# Patient Record
Sex: MALE | Race: WHITE | NOT HISPANIC OR LATINO | URBAN - METROPOLITAN AREA
[De-identification: names, ages, dates, MRNs, and addresses within clinical notes are randomized per-mention and may not be internally consistent; named-entity substitution may affect disease eponyms.]

---

## 2019-09-09 ENCOUNTER — HOSPITAL ENCOUNTER (OUTPATIENT)
Dept: OTHER | Facility: HOSPITAL | Age: 34
Discharge: HOME | End: 2019-09-09
Attending: NURSE PRACTITIONER

## 2019-09-09 ENCOUNTER — OFFICE VISIT CONVERTED (OUTPATIENT)
Dept: FAMILY MEDICINE CLINIC | Age: 34
End: 2019-09-09
Attending: NURSE PRACTITIONER

## 2019-09-09 LAB
ALBUMIN SERPL-MCNC: 4.4 G/DL (ref 3.5–5)
ALBUMIN/GLOB SERPL: 1.4 {RATIO} (ref 1.4–2.6)
ALP SERPL-CCNC: 60 U/L (ref 53–128)
ALT SERPL-CCNC: 21 U/L (ref 10–40)
ANION GAP SERPL CALC-SCNC: 15 MMOL/L (ref 8–19)
AST SERPL-CCNC: 15 U/L (ref 15–50)
BASOPHILS # BLD AUTO: 0.06 10*3/UL (ref 0–0.2)
BASOPHILS NFR BLD AUTO: 0.6 % (ref 0–3)
BILIRUB SERPL-MCNC: 0.38 MG/DL (ref 0.2–1.3)
BUN SERPL-MCNC: 10 MG/DL (ref 5–25)
BUN/CREAT SERPL: 9 {RATIO} (ref 6–20)
CALCIUM SERPL-MCNC: 9.2 MG/DL (ref 8.7–10.4)
CHLORIDE SERPL-SCNC: 103 MMOL/L (ref 99–111)
CHOLEST SERPL-MCNC: 171 MG/DL (ref 107–200)
CHOLEST/HDLC SERPL: 4.8 {RATIO} (ref 3–6)
CONV ABS IMM GRAN: 0.02 10*3/UL (ref 0–0.2)
CONV CO2: 25 MMOL/L (ref 22–32)
CONV IMMATURE GRAN: 0.2 % (ref 0–1.8)
CONV TOTAL PROTEIN: 7.5 G/DL (ref 6.3–8.2)
CREAT UR-MCNC: 1.06 MG/DL (ref 0.7–1.2)
DEPRECATED RDW RBC AUTO: 47.3 FL (ref 35.1–43.9)
EOSINOPHIL # BLD AUTO: 0.12 10*3/UL (ref 0–0.7)
EOSINOPHIL # BLD AUTO: 1.3 % (ref 0–7)
ERYTHROCYTE [DISTWIDTH] IN BLOOD BY AUTOMATED COUNT: 13.6 % (ref 11.6–14.4)
GFR SERPLBLD BASED ON 1.73 SQ M-ARVRAT: >60 ML/MIN/{1.73_M2}
GLOBULIN UR ELPH-MCNC: 3.1 G/DL (ref 2–3.5)
GLUCOSE SERPL-MCNC: 89 MG/DL (ref 70–99)
HCT VFR BLD AUTO: 46.5 % (ref 42–52)
HDLC SERPL-MCNC: 36 MG/DL (ref 40–60)
HGB BLD-MCNC: 15.4 G/DL (ref 14–18)
LDLC SERPL CALC-MCNC: 112 MG/DL (ref 70–100)
LYMPHOCYTES # BLD AUTO: 2.3 10*3/UL (ref 1–5)
LYMPHOCYTES NFR BLD AUTO: 24.6 % (ref 20–45)
MCH RBC QN AUTO: 31.3 PG (ref 27–31)
MCHC RBC AUTO-ENTMCNC: 33.1 G/DL (ref 33–37)
MCV RBC AUTO: 94.5 FL (ref 80–96)
MONOCYTES # BLD AUTO: 0.78 10*3/UL (ref 0.2–1.2)
MONOCYTES NFR BLD AUTO: 8.3 % (ref 3–10)
NEUTROPHILS # BLD AUTO: 6.07 10*3/UL (ref 2–8)
NEUTROPHILS NFR BLD AUTO: 65 % (ref 30–85)
NRBC CBCN: 0 % (ref 0–0.7)
OSMOLALITY SERPL CALC.SUM OF ELEC: 287 MOSM/KG (ref 273–304)
PLATELET # BLD AUTO: 274 10*3/UL (ref 130–400)
PMV BLD AUTO: 9.9 FL (ref 9.4–12.4)
POTASSIUM SERPL-SCNC: 4.1 MMOL/L (ref 3.5–5.3)
RBC # BLD AUTO: 4.92 10*6/UL (ref 4.7–6.1)
SODIUM SERPL-SCNC: 139 MMOL/L (ref 135–147)
TRIGL SERPL-MCNC: 115 MG/DL (ref 40–150)
TSH SERPL-ACNC: 2.21 M[IU]/L (ref 0.27–4.2)
VLDLC SERPL-MCNC: 23 MG/DL (ref 5–37)
WBC # BLD AUTO: 9.35 10*3/UL (ref 4.8–10.8)

## 2019-10-17 ENCOUNTER — OFFICE VISIT CONVERTED (OUTPATIENT)
Dept: PODIATRY | Facility: CLINIC | Age: 34
End: 2019-10-17
Attending: PODIATRIST

## 2019-11-25 ENCOUNTER — OFFICE VISIT CONVERTED (OUTPATIENT)
Dept: FAMILY MEDICINE CLINIC | Age: 34
End: 2019-11-25
Attending: NURSE PRACTITIONER

## 2020-04-27 ENCOUNTER — OFFICE VISIT CONVERTED (OUTPATIENT)
Dept: FAMILY MEDICINE CLINIC | Age: 35
End: 2020-04-27
Attending: NURSE PRACTITIONER

## 2021-05-15 VITALS
HEIGHT: 75 IN | WEIGHT: 239 LBS | DIASTOLIC BLOOD PRESSURE: 78 MMHG | BODY MASS INDEX: 29.72 KG/M2 | HEART RATE: 98 BPM | SYSTOLIC BLOOD PRESSURE: 147 MMHG | OXYGEN SATURATION: 98 %

## 2021-05-18 NOTE — PROGRESS NOTES
Carroll Patterson  1985     Office/Outpatient Visit    Visit Date: Mon, Apr 27, 2020 01:18 pm    Provider: Aric Saxena N.P. (Assistant: Makenzie Livingston MA)    Location: [facility]        Electronically signed by Aric Saxena N.P. on  04/27/2020 02:20:53 PM                             Subjective:        CC: Mr. Patterson is a 35 year old White male.  This is a follow-up visit.  [phone]            HPI:       Telehealth video visit for follow up on anxiety and depression. Taking Lexapro 20mg daily but having frequent episodes of aniety and mood swings, not wanting to do things even when has chance. Denies suicidal thoughts     ROS:     CONSTITUTIONAL:  Negative for chills, fatigue, fever, and weight change.      GASTROINTESTINAL:  Positive for acid reflux symptoms ( burning in throat and upper chest; indigestion and belching; worse with pizza sauce ).      PSYCHIATRIC:  Positive for anxiety, depression and feelings of stress.   Negative for suicidal thoughts.          Past Medical History / Family History / Social History:         Last Reviewed on 4/27/2020 02:17 PM by Aric Saxena    Past Medical History:             PAST MEDICAL HISTORY     UNREMARKABLE     Several concussions in the past playing foot ball         Surgical History:     NONE         Family History:     Father: Cause of death was 56, cancer multiple    ; Positive for Hypertension and Hyperlipidemia;     ; Positive for Agent Orange;     Mother:    Positive for Hypertension and Hyperlipidemia;     Brother(s): 1 brother alive and healthy brother(s) total     Sister(s): Medical history unknown; 2 , 1/2 sisters sister(s) total         Social History:     Occupation: [employer]     Marital Status: Engaged     Children: 1 child         Tobacco/Alcohol/Supplements:     Last Reviewed on 4/27/2020 02:17 PM by Aric Saxena    Tobacco: Current Smoker: He currently smokes every day, (start age 27  pack-year history); Cigars.          Mental Health History:     Last Reviewed on 4/27/2020 02:17 PM by Aric Saxena        Current Problems:     Last Reviewed on 4/27/2020 02:17 PM by Aric Saxena    Dysthymic disorder    Gastro-esophageal reflux disease without esophagitis    Corns and callosities        Immunizations:     None        Allergies:     Last Reviewed on 4/27/2020 02:17 PM by Aric Saxena    Amoxicillin:          Current Medications:     Last Reviewed on 4/27/2020 02:17 PM by Aric Saxena    Protonix 40 mg oral tablet, delayed release (enteric coated) [take 1 tablet (40 mg) by oral route 2 times per day]    Lexapro 20 mg oral tablet [take 1 tablet (20 mg) by oral route once daily]        Objective:        Exams:     PHYSICAL EXAM:     GENERAL: Vitals recorded well developed, well nourished;  well groomed;  no apparent distress;     NEUROLOGIC: GROSSLY INTACT     PSYCHIATRIC: appropriate affect and demeanor; normal psychomotor function; normal speech pattern;         Assessment:         F34.1   Dysthymic disorder       K21.9   Gastro-esophageal reflux disease without esophagitis           ORDERS:         Meds Prescribed:       [Refilled] Lexapro 20 mg oral tablet [take 1 tablet (20 mg) by oral route once daily], #90 (ninety) tablets, Refills: 1 (one)       [New Rx] buPROPion  mg oral tablet, sustained-release 12 hr [take 1 tablet (100 mg) by oral route 2 times per day], #30 (thirty) tablets, Refills: 0 (zero)       [New Rx] RABEprazole 20 mg oral tablet, delayed release (enteric coated) [take 1 tablet (20 mg) by oral route daily prn ], #30 (thirty) tablets, Refills: 0 (zero)                 Plan:         Dysthymic disorder    Telehealth: Verbal consent obtained for visit to occur via televideo conferencing; Staff, other than provider, present during telephone visit include Handy Patterson pt , Cade Saxena APRN     FOLLOW-UP: Schedule follow-up appointments on a p.r.n.  basis.:.:for see how he is doing in 2 weeks with med changes           Prescriptions:       [Refilled] Lexapro 20 mg oral tablet [take 1 tablet (20 mg) by oral route once daily], #90 (ninety) tablets, Refills: 1 (one)       [New Rx] buPROPion  mg oral tablet, sustained-release 12 hr [take 1 tablet (100 mg) by oral route 2 times per day], #30 (thirty) tablets, Refills: 0 (zero)         Gastro-esophageal reflux disease without esophagitis          Prescriptions:       [New Rx] RABEprazole 20 mg oral tablet, delayed release (enteric coated) [take 1 tablet (20 mg) by oral route daily prn ], #30 (thirty) tablets, Refills: 0 (zero)             Patient Recommendations:        For  Dysthymic disorder:    Schedule follow-up appointments as needed.                APPOINTMENT INFORMATION:        Monday Tuesday Wednesday Thursday Friday Saturday Sunday            Time:___________________AM  PM   Date:_____________________             Charge Capture:         Primary Diagnosis:     F34.1  Dysthymic disorder           Orders:      80209  Office/outpatient visit; established patient, level 3  (In-House)              K21.9  Gastro-esophageal reflux disease without esophagitis

## 2021-05-18 NOTE — PROGRESS NOTES
Carroll Patterson 1985     Office/Outpatient Visit    Visit Date: Mon, Sep 9, 2019 05:12 pm    Provider: Aric Saxena N.P. (Assistant: Sofia Da Silva MA)    Location: [facility]        Electronically signed by Aric Saxena N.P. on  09/13/2019 12:57:31 PM                             SUBJECTIVE:        CC:     Mr. Patterson is a 34 year old White male.  This is his first visit to the clinic.  establishment         HPI:         PHQ-9 Depression Screening: Completed form scanned and in chart; Total Score 16         Concerning annual exam, his last physical exam was over 5 years ago.  He performs testicular self-exams occasionally.  Depression screening is positive for anxious mood and sadness.  He is not current with his influenza immunization.      Smoking status: Mr. Patterson currently smokes cigarettes.      ROS:     CONSTITUTIONAL:  Negative for chills, fatigue, fever, and weight change.      CARDIOVASCULAR:  Negative for chest pain, palpitations, tachycardia, orthopnea, and edema.      RESPIRATORY:  Positive for Smoker not ready to quit.   Negative for recent cough, dyspnea or frequent wheezing.      GASTROINTESTINAL:  Positive for acid reflux symptoms ( acid taste in mouth; burning in throat and upper chest; indigestion and belching; several year hx , never had EGD  takes PPI with some improvement , never been tested for H pylori ).   Negative for abdominal pain, constipation, diarrhea, nausea or vomiting.      GENITOURINARY:  Negative for dysuria and hematuria.      MUSCULOSKELETAL:  Negative for arthralgias and myalgias.      INTEGUMENTARY:  Positive for corns and callous on both feet, has used everything OTC and nonthing has made them clear up wants to see foot Md and Skin nodule on right elbow x 2 years , is not painful , moves around and can bend it in half.      NEUROLOGICAL:  Negative for dizziness, memory loss, paresthesias, tremor and weakness.      PSYCHIATRIC:  Positive for  anxiety, depression and feelings of stress.   Negative for sleep disturbance or suicidal thoughts.          PMH/FMH/SH:     Last Reviewed on 9/09/2019 05:40 PM by Aric Saxena    Past Medical History:             PAST MEDICAL HISTORY     UNREMARKABLE     Several concussions in the past playing foot ball         Surgical History:     NONE         Family History:     Father: Cause of death was 56, cancer multiple    ; Positive for Hypertension and Hyperlipidemia;     ; Positive for Agent Orange;     Mother:    Positive for Hypertension and Hyperlipidemia;     Brother(s): 1 brother alive and healthy brother(s) total     Sister(s): Medical history unknown; 2 , 1/2 sisters sister(s) total         Social History:     Occupation: [employer]     Marital Status: Engaged     Children: 1 child         Tobacco/Alcohol/Supplements:     Last Reviewed on 9/09/2019 05:40 PM by Aric Saxena    Tobacco: Current Smoker: He currently smokes every day, (start age 27 pack-year history); Cigars.          Mental Health History:     Last Reviewed on 9/09/2019 05:40 PM by Aric Saxena            Current Problems:     Last Reviewed on 9/09/2019 05:40 PM by Aric Saxena    Skin nodule     Screening for depression         Immunizations:     None        Allergies:     Last Reviewed on 9/09/2019 05:40 PM by Aric Saxena    Amoxicillin:        Current Medications:     Last Reviewed on 9/09/2019 05:40 PM by Aric Saxena      No Known Medications.         OBJECTIVE:        Vitals:         Current: 9/9/2019 5:17:18 PM    Ht:  6 ft, 3 in;  Wt: 238.8 lbs;  BMI: 29.8    T: 99.2 F;  BP: 140/83 mm Hg (left arm, sitting);  P: 98 bpm (left arm (BP Cuff), sitting)        Exams:     PHYSICAL EXAM:     GENERAL: Vitals recorded well developed, well nourished;  well groomed;  no apparent distress;     E/N/T:  normal EACs, TMs, nasal/oral mucosa, teeth, gingiva, and oropharynx;     RESPIRATORY: normal respiratory rate and  pattern with no distress; normal breath sounds with no rales, rhonchi, wheezes or rubs;     CARDIOVASCULAR: normal rate; rhythm is regular;  no systolic murmur; no edema;     GASTROINTESTINAL: mild epigastric tenderness;  normal bowel sounds; no organomegaly;     SKIN: Corns chucky feet;  right elbow semi firm mobile nontender nodule right elbow;     MUSCULOSKELETAL:  Normal range of motion, strength and tone;     NEUROLOGIC: CN 2-12, motor and sensory function, reflexes, gait and coordination are all intact;     PSYCHIATRIC:  appropriate affect and demeanor; normal speech pattern; grossly normal memory;         ASSESSMENT           V79.0   Z13.31  Screening for depression              DDx:     V70.0   Z00.00  Annual exam              DDx:     782.2   R22.31  Skin nodule              DDx:     530.81   K21.9  Gastroesophageal reflux disease              DDx:     700   L84  Corns and callosities              DDx:     300.4   F34.1  Anxiety with depression              DDx:         ORDERS:         Meds Prescribed:       Protonix (Pantoprazole) 40mg Tablets, Delayed Release 1 tab daily  #30 (Thirty) tablet(s) Refills: 2       Carafate (Sucralfate) 1gm Tablet 1 TAB BID  #20 (Twenty) tablet(s) Refills: 0       Lexapro (Escitalopram Oxalate) 20mg Tablet 1/2 tab at h.s. FOR 1 WEEK, THEN INCREASE TO 1 TAB QHS  #30 (Thirty) tablet(s) Refills: 1         Lab Orders:       [id number]  [facility] PHYSICAL: CMP, CBC, TSH, LIPID: [id number], [id number], [id number], [id number]  (Send-Out)         79405  [facility] - [facility] H.pylori Breath test  (Send-Out)         APPTO  Appointment need  (In-House)           Procedures Ordered:       REFER  Referral to Specialist or Other Facility  (Send-Out)           Other Orders:         Depression screen positive and follow up plan documented  (In-House)         1101F  Pt screen for fall risk; document no falls in past year or only 1 fall w/o injury in past year (BRADY)  (In-House)                   PLAN:          Screening  for depression Harshal mooney pt, # [id number] neg. dmt     MIPS PHQ-9 Depression Screening: Completed form scanned and in chart; Total Score 16 Positive Depression Screen: Pharmacologic intervention initiated/modified; 1mo           Orders:         Depression screen positive and follow up plan documented  (In-House)            Annual exam     LABORATORY:  Labs ordered to be performed today include PHYSICAL PANEL; CMP, CBC, TSH, LIPID.  MIPS Has had no falls in the past year Vaccines Flu and Pneumonia updated in Shot record   Smoking cessation encouraged. Counseling for less than 3 minutes.            Orders:       [id number]  [facility] PHYSICAL: CMP, CBC, TSH, LIPID: [id number], [id number], 71633, 55297  (Send-Out)         1101F  Pt screen for fall risk; document no falls in past year or only 1 fall w/o injury in past year (BRADY)  (In-House)            Skin nodule Referral to Dr Eden , [facility]          Gastroesophageal reflux disease     LABORATORY:  Labs ordered to be performed today include H. pylori breath test.      REFERRALS:  Referral initiated to a general surgeon ( Dr. Prateek Eden ).            Prescriptions:       Protonix (Pantoprazole) 40mg Tablets, Delayed Release 1 tab daily  #30 (Thirty) tablet(s) Refills: 2       Carafate (Sucralfate) 1gm Tablet 1 TAB BID  #20 (Twenty) tablet(s) Refills: 0           Orders:       [id number]  [facility] H.pylori Breath test  (Send-Out)            Corns and callosities         REFERRALS:  Referral initiated to a podiatrist ( Dharmesh Guido [facility] ).            Orders:       REFER  Referral to Specialist or Other Facility  (Send-Out)            Anxiety with depression         FOLLOW-UP: Schedule a follow-up visit in 1 month..            Prescriptions:       Lexapro (Escitalopram Oxalate) 20mg Tablet 1/2 tab at h.s. FOR 1 WEEK, THEN INCREASE TO 1 TAB QHS  #30 (Thirty) tablet(s) Refills: 1           Orders:       APPTO  Appointment need  (In-House)               Patient  Recommendations:        For  Anxiety with depression:     Schedule a follow-up visit in 1 month.                APPOINTMENT INFORMATION:        Monday Tuesday Wednesday Thursday Friday Saturday Sunday            Time:___________________AM  PM   Date:_____________________             CHARGE CAPTURE           **Please note: ICD descriptions below are intended for billing purposes only and may not represent clinical diagnoses**        Primary Diagnosis:         V79.0 Screening for depression            Z13.31    Encounter for screening for depression              Orders:          99312 -25  Office visit - new pt, level 3  (In-House)                Depression screen positive and follow up plan documented  (In-House)           V70.0 Annual exam            Z00.00    Encounter for general adult medical examination without abnormal findings              Orders:          18889   Preventive medicine, new patient, age 18-39 years  (In-House)             1101F   Pt screen for fall risk; document no falls in past year or only 1 fall w/o injury in past year (BRADY)  (In-House)           782.2 Skin nodule            R22.31    Localized swelling, mass and lump, right upper limb    530.81 Gastroesophageal reflux disease            K21.9    Gastro-esophageal reflux disease without esophagitis    700 Corns and callosities            L84    Corns and callosities    300.4 Anxiety with depression            F34.1    Dysthymic disorder              Orders:          APPTO   Appointment need  (In-House)

## 2021-05-18 NOTE — PROGRESS NOTES
Carroll Patterson  1985     Office/Outpatient Visit    Visit Date: Mon, Nov 25, 2019 05:00 pm    Provider: Aric Saxena N.P. (Assistant: Itzel Hobson MA)    Location: [facility]        Electronically signed by Aric Saxena N.P. on  11/25/2019 05:42:40 PM                             Subjective:        CC: Mr. Patterson is a 34 year old White male.  This is a follow-up visit.          HPI:       Taking Lexapro with improvement in mood. Still has occasional outburst  but very mild and infrequent. Feeling much better.     ROS:     CONSTITUTIONAL:  Negative for chills, fatigue and fever.      CARDIOVASCULAR:  Negative for chest pain, orthopnea, paroxysmal nocturnal dyspnea and pedal edema.      RESPIRATORY:  Negative for dyspnea and cough.      GASTROINTESTINAL:  Positive for acid reflux symptoms ( burning in throat and upper chest; indigestion and belching; taking Protonix 40mg daily with some mild improvement, took 2 weeks Carafate and stomach felt much better while taking it but once finished his symtpoms have returned, missed apt with GI because was not told of appt. ).      PSYCHIATRIC:  Positive for anxiety, depression and feelings of stress ( (improved) ).   Negative for suicidal thoughts.          Past Medical History / Family History / Social History:         Last Reviewed on 11/25/2019 05:42 PM by Aric Saxena    Past Medical History:             PAST MEDICAL HISTORY     UNREMARKABLE     Several concussions in the past playing foot ball         Surgical History:     NONE         Family History:     Father: Cause of death was 56, cancer multiple    ; Positive for Hypertension and Hyperlipidemia;     ; Positive for Agent Orange;     Mother:    Positive for Hypertension and Hyperlipidemia;     Brother(s): 1 brother alive and healthy brother(s) total     Sister(s): Medical history unknown; 2 , 1/2 sisters sister(s) total         Social History:     Occupation: [employer]     Marital Status: Engaged     Children: 1 child         Tobacco/Alcohol/Supplements:     Last Reviewed on 11/25/2019 05:42 PM by Aric Saxena    Tobacco: Current Smoker: He currently smokes every day, (start age 27 pack-year history); Cigars.          Mental Health History:     Last Reviewed on 11/25/2019 05:42 PM by Aric Saxena        Current Problems:     Last Reviewed on 11/25/2019 05:42 PM by Aric Saxena    Dysthymic disorder    Gastro-esophageal reflux disease without esophagitis    Corns and callosities        Immunizations:     None        Allergies:     Last Reviewed on 11/25/2019 05:42 PM by Aric Saxena    Amoxicillin:          Current Medications:     Last Reviewed on 11/25/2019 05:42 PM by Aric Saxena    Protonix 40mg Tablets, Delayed Release [1 tab daily]    Lexapro 20 mg oral tablet [take 1 tablet (20 mg) by oral route once daily]        Objective:        Vitals:         Current: 11/25/2019 5:03:36 PM    Ht:  6 ft, 3 in;  Wt: 240.6 lbs;  BMI: 30.1T: 98.3 F (oral);  BP: 133/72 mm Hg (left arm, sitting);  P: 71 bpm (left arm (BP Cuff), sitting);  sCr: 1.06 mg/dL;  GFR: 113.39        Exams:     PHYSICAL EXAM:     GENERAL: Vitals recorded well developed, well nourished;  well groomed;  no apparent distress;     E/N/T:  normal EACs, TMs, nasal/oral mucosa, teeth, gingiva, and oropharynx;     RESPIRATORY: normal respiratory rate and pattern with no distress; normal breath sounds with no rales, rhonchi, wheezes or rubs;     CARDIOVASCULAR: normal rate; rhythm is regular;  normal S1; normal S2; no systolic murmur; no cyanosis; no edema;     GASTROINTESTINAL: mild epigastric tenderness;  normal bowel sounds; no organomegaly;     NEUROLOGIC: GROSSLY INTACT     PSYCHIATRIC:  appropriate affect and demeanor; normal speech pattern; grossly normal memory;         Assessment:         F34.1   Dysthymic disorder       K21.9   Gastro-esophageal reflux disease without  esophagitis           ORDERS:         Meds Prescribed:       [Refilled] Protonix 40 mg oral tablet, delayed release (enteric coated) [take 1 tablet (40 mg) by oral route 2 times per day], #60 (sixty) tablets, Refills: 2 (two)       [Refilled] Lexapro 20 mg oral tablet [take 1 tablet (20 mg) by oral route once daily], #90 (ninety) tablets, Refills: 3 (three)                 Plan:         Dysthymic disorder          Prescriptions:       [Refilled] Lexapro 20 mg oral tablet [take 1 tablet (20 mg) by oral route once daily], #90 (ninety) tablets, Refills: 3 (three)         Gastro-esophageal reflux disease without esophagitisWill get apt with GI rescheduled. Did not receive call to notify of apt. dmt          Prescriptions:       [Refilled] Protonix 40 mg oral tablet, delayed release (enteric coated) [take 1 tablet (40 mg) by oral route 2 times per day], #60 (sixty) tablets, Refills: 2 (two)             Charge Capture:         Primary Diagnosis:     F34.1  Dysthymic disorder           Orders:      [id number]  Office/outpatient visit; established patient, level 3  (In-House)              K21.9  Gastro-esophageal reflux disease without esophagitis

## 2021-07-01 VITALS
SYSTOLIC BLOOD PRESSURE: 140 MMHG | BODY MASS INDEX: 29.69 KG/M2 | HEART RATE: 98 BPM | DIASTOLIC BLOOD PRESSURE: 83 MMHG | TEMPERATURE: 99.2 F | HEIGHT: 75 IN | WEIGHT: 238.8 LBS

## 2021-07-01 VITALS
BODY MASS INDEX: 29.91 KG/M2 | TEMPERATURE: 98.3 F | HEART RATE: 71 BPM | WEIGHT: 240.6 LBS | SYSTOLIC BLOOD PRESSURE: 133 MMHG | DIASTOLIC BLOOD PRESSURE: 72 MMHG | HEIGHT: 75 IN

## 2025-05-19 ENCOUNTER — OFFICE VISIT (OUTPATIENT)
Dept: FAMILY MEDICINE CLINIC | Facility: CLINIC | Age: 40
End: 2025-05-19
Payer: COMMERCIAL

## 2025-05-19 VITALS
OXYGEN SATURATION: 97 % | BODY MASS INDEX: 28.16 KG/M2 | RESPIRATION RATE: 16 BRPM | SYSTOLIC BLOOD PRESSURE: 128 MMHG | HEIGHT: 74 IN | DIASTOLIC BLOOD PRESSURE: 80 MMHG | HEART RATE: 70 BPM | TEMPERATURE: 98.1 F | WEIGHT: 219.4 LBS

## 2025-05-19 DIAGNOSIS — Z00.00 ANNUAL PHYSICAL EXAM: Primary | ICD-10-CM

## 2025-05-19 DIAGNOSIS — L84 FOOT CALLUS: ICD-10-CM

## 2025-05-19 NOTE — PROGRESS NOTES
Subjective   Carroll Patterson is a 40 y.o. male who presents for annual male wellness exam.  Chief Complaint   Patient presents with    Establish Care     Referral to foot doctor.    feet pain        Sexual History: with wife only   Exercise: work is physical  Do you feel safe? yes  Last dental exam: last year  Eye exam: long time  Colon Cancer Screening: none  PSA: none    Patient complains today of chronic bilateral feet pain for 15 years.  Patient states he has calluses and corns.  Patient is asking for podiatry referral.    Patient states he has a history of stomach ulcer long time ago during college, he did have an episode of bleeding.  He was scheduled for endoscopy but never completed it.  Patient states now he takes over-the-counter Nexium as needed.  Patient states his entire family has stomach problems.        There is no immunization history on file for this patient.    The following portions of the patient's history were reviewed and updated as appropriate: allergies, current medications, past family history, past medical history, past social history, past surgical history and problem list.    Past Medical History:   Diagnosis Date    Hypertension        History reviewed. No pertinent surgical history.    Family History   Problem Relation Age of Onset    Cancer Father     Throat cancer Maternal Grandmother        Social History     Socioeconomic History    Marital status: Single   Tobacco Use    Smoking status: Never    Smokeless tobacco: Never    Tobacco comments:     cigars   Vaping Use    Vaping status: Never Used   Substance and Sexual Activity    Alcohol use: Yes     Alcohol/week: 2.0 standard drinks of alcohol     Types: 2 Shots of liquor per week       No current outpatient medications on file.    Review of Systems   Constitutional:  Negative for chills, fatigue, fever and unexpected weight change.   Eyes:  Negative for visual disturbance.   Respiratory:  Negative for choking, chest tightness and  shortness of breath.    Cardiovascular:  Negative for chest pain, palpitations and leg swelling.   Gastrointestinal:  Positive for abdominal pain (Intermittent). Negative for blood in stool, constipation and diarrhea.   Genitourinary:  Negative for dysuria.   Neurological:  Negative for syncope, light-headedness and numbness.       Objective   Vitals:    05/19/25 1008   BP: 128/80   Pulse: 70   Resp: 16   Temp: 98.1 °F (36.7 °C)   SpO2: 97%     Body mass index is 28.17 kg/m².  Physical Exam  Constitutional:       Appearance: Normal appearance.   Cardiovascular:      Rate and Rhythm: Normal rate and regular rhythm.      Pulses: Normal pulses.      Heart sounds: No murmur heard.  Pulmonary:      Effort: Pulmonary effort is normal.      Breath sounds: Normal breath sounds.   Abdominal:      General: Abdomen is flat.      Palpations: Abdomen is soft.      Tenderness: There is no abdominal tenderness.   Musculoskeletal:      Right lower leg: No edema.      Left lower leg: No edema.   Skin:     Capillary Refill: Capillary refill takes less than 2 seconds.      Comments: Multiple tender foot calluses and corns bilaterally.   Neurological:      Mental Status: He is alert.           Assessment & Plan   Diagnoses and all orders for this visit:    1. Annual physical exam (Primary)  -     Lipid Panel  -     CBC & Differential  -     Comprehensive Metabolic Panel  -     Urinalysis With Microscopic - Urine, Clean Catch  -     Hemoglobin A1c  -     TSH Rfx On Abnormal To Free T4  -     Hepatitis C Antibody  -     Vitamin D 25 hydroxy    2. Foot callus  -     Ambulatory Referral to Podiatry        Annual physical  Check routine labs  Discussed the importance of maintaining a healthy weight and getting regular exercise.  Educated patient on the benefits of healthy diet.  Advise follow-up annually for wellness exams.    Foot calluses  Refer to podiatry    There are no Patient Instructions on file for this visit.

## 2025-06-03 ENCOUNTER — TELEPHONE (OUTPATIENT)
Dept: FAMILY MEDICINE CLINIC | Facility: CLINIC | Age: 40
End: 2025-06-03
Payer: COMMERCIAL

## 2025-06-12 ENCOUNTER — TELEPHONE (OUTPATIENT)
Dept: FAMILY MEDICINE CLINIC | Facility: CLINIC | Age: 40
End: 2025-06-12
Payer: COMMERCIAL

## 2025-06-17 ENCOUNTER — TELEPHONE (OUTPATIENT)
Dept: FAMILY MEDICINE CLINIC | Facility: CLINIC | Age: 40
End: 2025-06-17
Payer: COMMERCIAL